# Patient Record
Sex: MALE | Race: WHITE | URBAN - NONMETROPOLITAN AREA
[De-identification: names, ages, dates, MRNs, and addresses within clinical notes are randomized per-mention and may not be internally consistent; named-entity substitution may affect disease eponyms.]

---

## 2017-02-18 DIAGNOSIS — R56.9 PARTIAL SEIZURE (HCC): ICD-10-CM

## 2017-02-20 RX ORDER — TOPIRAMATE 25 MG/1
TABLET ORAL
Qty: 540 TABLET | Refills: 0 | Status: SHIPPED | OUTPATIENT
Start: 2017-02-20 | End: 2017-06-12 | Stop reason: SDUPTHER

## 2017-02-20 RX ORDER — DIVALPROEX SODIUM 250 MG/1
TABLET, EXTENDED RELEASE ORAL
Qty: 180 TABLET | Refills: 0 | Status: SHIPPED | OUTPATIENT
Start: 2017-02-20 | End: 2017-06-12 | Stop reason: SDUPTHER

## 2017-06-12 ENCOUNTER — OFFICE VISIT (OUTPATIENT)
Dept: PHYSICAL MEDICINE AND REHAB | Age: 22
End: 2017-06-12

## 2017-06-12 VITALS
WEIGHT: 164.6 LBS | HEART RATE: 72 BPM | DIASTOLIC BLOOD PRESSURE: 79 MMHG | SYSTOLIC BLOOD PRESSURE: 121 MMHG | BODY MASS INDEX: 24.38 KG/M2 | HEIGHT: 69 IN

## 2017-06-12 DIAGNOSIS — R56.9 PARTIAL SEIZURE (HCC): Primary | ICD-10-CM

## 2017-06-12 PROCEDURE — 99213 OFFICE O/P EST LOW 20 MIN: CPT | Performed by: PSYCHIATRY & NEUROLOGY

## 2017-06-12 RX ORDER — TOPIRAMATE 25 MG/1
TABLET ORAL
Qty: 180 TABLET | Refills: 3 | Status: SHIPPED | OUTPATIENT
Start: 2017-06-12 | End: 2018-07-03 | Stop reason: SDUPTHER

## 2017-06-12 RX ORDER — DIVALPROEX SODIUM 250 MG/1
TABLET, EXTENDED RELEASE ORAL
Qty: 180 TABLET | Refills: 3 | Status: SHIPPED | OUTPATIENT
Start: 2017-06-12 | End: 2017-06-12 | Stop reason: SDUPTHER

## 2017-06-12 RX ORDER — TOPIRAMATE 25 MG/1
TABLET ORAL
Qty: 540 TABLET | Refills: 3 | Status: SHIPPED | OUTPATIENT
Start: 2017-06-12 | End: 2017-06-12 | Stop reason: SDUPTHER

## 2017-06-12 RX ORDER — DIVALPROEX SODIUM 250 MG/1
TABLET, EXTENDED RELEASE ORAL
Qty: 60 TABLET | Refills: 3 | Status: SHIPPED | OUTPATIENT
Start: 2017-06-12 | End: 2018-07-03 | Stop reason: SDUPTHER

## 2017-06-13 RX ORDER — TOPIRAMATE 50 MG/1
75 TABLET, FILM COATED ORAL 2 TIMES DAILY
Qty: 270 TABLET | Refills: 3 | Status: SHIPPED | OUTPATIENT
Start: 2017-06-13 | End: 2018-07-03 | Stop reason: SDUPTHER

## 2018-07-03 DIAGNOSIS — R56.9 PARTIAL SEIZURE (HCC): ICD-10-CM

## 2018-07-03 RX ORDER — DIVALPROEX SODIUM 250 MG/1
TABLET, EXTENDED RELEASE ORAL
Qty: 60 TABLET | Refills: 1 | Status: SHIPPED | OUTPATIENT
Start: 2018-07-03 | End: 2018-08-21 | Stop reason: SDUPTHER

## 2018-07-03 RX ORDER — TOPIRAMATE 50 MG/1
50 TABLET, FILM COATED ORAL 2 TIMES DAILY
Qty: 60 TABLET | Refills: 1 | Status: SHIPPED | OUTPATIENT
Start: 2018-07-03 | End: 2018-08-15 | Stop reason: SDUPTHER

## 2018-07-03 RX ORDER — TOPIRAMATE 25 MG/1
25 TABLET ORAL 2 TIMES DAILY
Qty: 60 TABLET | Refills: 1 | Status: SHIPPED | OUTPATIENT
Start: 2018-07-03 | End: 2018-08-15 | Stop reason: SDUPTHER

## 2018-08-15 DIAGNOSIS — R56.9 PARTIAL SEIZURE (HCC): Primary | ICD-10-CM

## 2018-08-15 RX ORDER — TOPIRAMATE 50 MG/1
TABLET, FILM COATED ORAL
Qty: 180 TABLET | Refills: 0 | Status: SHIPPED | OUTPATIENT
Start: 2018-08-15 | End: 2018-08-21 | Stop reason: SDUPTHER

## 2018-08-15 RX ORDER — TOPIRAMATE 25 MG/1
25 TABLET ORAL 2 TIMES DAILY
Qty: 60 TABLET | Refills: 2 | Status: SHIPPED | OUTPATIENT
Start: 2018-08-15 | End: 2018-08-21 | Stop reason: SDUPTHER

## 2018-08-16 LAB
ABSOLUTE BASO #: 0 /CMM (ref 0–200)
ABSOLUTE EOS #: 0 /CMM (ref 0–500)
ABSOLUTE LYMPH #: 2100 /CMM (ref 1000–4800)
ABSOLUTE MONO #: 500 /CMM (ref 0–800)
ABSOLUTE NEUT #: 4300 /CMM (ref 1800–7700)
ALBUMIN SERPL-MCNC: 5 GM/DL (ref 3.5–5)
ALP BLD-CCNC: 75 IU/L (ref 41–137)
ALT SERPL-CCNC: 17 IU/L (ref 10–40)
AST SERPL-CCNC: 20 IU/L (ref 15–41)
BASOPHILS RELATIVE PERCENT: 0.3 % (ref 0–2)
BILIRUB SERPL-MCNC: 0.7 MG/DL (ref 0.2–1)
BILIRUBIN DIRECT: 0.2 MG/DL (ref 0.1–0.2)
EOSINOPHILS RELATIVE PERCENT: 0.4 % (ref 0–6)
HCT VFR BLD CALC: 45.9 % (ref 40–49)
HEMOGLOBIN: 15.5 GM/DL (ref 13.5–16.5)
LYMPHOCYTES RELATIVE PERCENT: 29.6 % (ref 15–45)
MCH RBC QN AUTO: 30.5 PG (ref 27.5–33)
MCHC RBC AUTO-ENTMCNC: 33.8 GM/DL (ref 33–36)
MCV RBC AUTO: 90.3 CU MIC (ref 80–97)
MONOCYTES RELATIVE PERCENT: 7.3 % (ref 2–10)
NEUTROPHILS RELATIVE PERCENT: 62.4 % (ref 40–70)
NUCLEATED RBCS: 0.1 /100 WBC
PDW BLD-RTO: 12.2 % (ref 12–16)
PLATELET # BLD: 252 TH/CMM (ref 150–400)
RBC # BLD: 5.09 MIL/CMM (ref 4.5–6)
TOTAL PROTEIN: 7.6 G/DL (ref 6.2–8)
VALPROIC ACID LEVEL: 56.7 MCG/ML (ref 50–100)
WBC # BLD: 7 TH/CMM (ref 4.4–10.5)

## 2018-08-17 ENCOUNTER — TELEPHONE (OUTPATIENT)
Dept: NEUROLOGY | Age: 23
End: 2018-08-17

## 2018-08-17 NOTE — TELEPHONE ENCOUNTER
----- Message from Radha Joshi MD sent at 8/17/2018  3:57 AM EDT -----  Please let patient Know lab results were normal. Depakote level was 56.7 (normal).    Radha Joshi MD

## 2018-08-21 ENCOUNTER — OFFICE VISIT (OUTPATIENT)
Dept: NEUROLOGY | Age: 23
End: 2018-08-21
Payer: COMMERCIAL

## 2018-08-21 VITALS
HEART RATE: 60 BPM | DIASTOLIC BLOOD PRESSURE: 72 MMHG | HEIGHT: 66 IN | SYSTOLIC BLOOD PRESSURE: 118 MMHG | WEIGHT: 167 LBS | BODY MASS INDEX: 26.84 KG/M2

## 2018-08-21 DIAGNOSIS — R56.9 PARTIAL SEIZURE (HCC): ICD-10-CM

## 2018-08-21 DIAGNOSIS — G40.909 SEIZURE DISORDER (HCC): Primary | ICD-10-CM

## 2018-08-21 PROCEDURE — 99213 OFFICE O/P EST LOW 20 MIN: CPT | Performed by: PSYCHIATRY & NEUROLOGY

## 2018-08-21 PROCEDURE — 1036F TOBACCO NON-USER: CPT | Performed by: PSYCHIATRY & NEUROLOGY

## 2018-08-21 PROCEDURE — G8419 CALC BMI OUT NRM PARAM NOF/U: HCPCS | Performed by: PSYCHIATRY & NEUROLOGY

## 2018-08-21 PROCEDURE — G8427 DOCREV CUR MEDS BY ELIG CLIN: HCPCS | Performed by: PSYCHIATRY & NEUROLOGY

## 2018-08-21 RX ORDER — TOPIRAMATE 50 MG/1
TABLET, FILM COATED ORAL
Qty: 180 TABLET | Refills: 0 | Status: SHIPPED | OUTPATIENT
Start: 2018-08-21 | End: 2019-05-02 | Stop reason: SDUPTHER

## 2018-08-21 RX ORDER — DIVALPROEX SODIUM 250 MG/1
TABLET, EXTENDED RELEASE ORAL
Qty: 60 TABLET | Refills: 1 | Status: SHIPPED | OUTPATIENT
Start: 2018-08-21 | End: 2018-11-26 | Stop reason: SDUPTHER

## 2018-08-21 RX ORDER — TOPIRAMATE 25 MG/1
25 TABLET ORAL 2 TIMES DAILY
Qty: 60 TABLET | Refills: 2 | Status: SHIPPED | OUTPATIENT
Start: 2018-08-21 | End: 2019-05-02

## 2018-08-21 NOTE — PATIENT INSTRUCTIONS
1. Continue with Topamax and Depakote at current doses. Refills given. 2. CBC, hepatic panel, and Depakote level. 3. Continue to take calcium and vitamin D  4. Report any new events  5. Follow up in 1 year or sooner if needed. 6. Call if any questions or concerns.

## 2018-08-21 NOTE — PROGRESS NOTES
edema        DATA:  Results for orders placed or performed in visit on 08/15/18   CBC   Result Value Ref Range    WBC 7.0 4.4 - 10.5 th/cmm    RBC 5.09 4.50 - 6.00 mil/cmm    Hemoglobin 15.5 13.5 - 16.5 gm/dL    Hematocrit 45.9 40.0 - 49.0 %    MCV 90.3 80 - 97 CU BRIAN    MCH 30.5 27.5 - 33.0 PG    MCHC 33.8 33.0 - 36.0 gm/dL    RDW 12.2 12.0 - 16.0 %    Platelets 454 621 - 758 th/cmm    Neutrophils % 62.4 40 - 70 %    Lymphocytes % 29.6 15 - 45 %    Monocytes % 7.3 2 - 10 %    Eosinophils % 0.4 0 - 6 %    Basophils % 0.3 0 - 2 %    Nucleated RBCs 0.1 <1 /100 WBC    Absolute Neut # 4300 1800 - 7700 /cmm    Absolute Lymph # 2100 1000 - 4800 /cmm    Absolute Mono # 500 0 - 800 /cmm    Absolute Eos # 0 0 - 500 /cmm    Absolute Baso # 0 0 - 200 /cmm   Hepatic Function Panel   Result Value Ref Range    AST 20 15 - 41 IU/L    Alkaline Phosphatase 75 41 - 137 IU/L    Total Bilirubin 0.7 0.2 - 1.0 mg/dL    Bilirubin, Direct 0.2 0.1 - 0.2 mg/dL    Alb 5.0 3.5 - 5.0 gm/dL    Total Protein 7.6 6.2 - 8.0 g/dL    ALT 17 10 - 40 IU/L   Valproic Acid (Depakote)   Result Value Ref Range    Valproic Acid Lvl 56.7 50.0 - 100.0 mcg/mL           Assessment:     Diagnosis Orders   1. Seizure disorder [facility])        He is doing good. He comes in with his mother. No seizure activity reported. Last seizure was 12 years ago. He is compliant with his medications, denies any side effects to the medications. He is taking calcium and vitamin D supplements. Will give refills on medications and have patient complete blood work listed below. After detailed discussion with patient and his mother we agreed on the following plan. Plan:  1. Continue with Topamax and Depakote at current doses. Refills given. 2. CBC, hepatic panel, and Depakote level for next visit. 3. Continue to take calcium and vitamin D  4. Report any new events  5. Follow up in 1 year or sooner if needed. 6. Call if any questions or concerns.       Please call if any questions.      Timothy Hatch MD

## 2018-11-26 DIAGNOSIS — R56.9 PARTIAL SEIZURE (HCC): ICD-10-CM

## 2018-11-26 RX ORDER — DIVALPROEX SODIUM 250 MG/1
TABLET, EXTENDED RELEASE ORAL
Qty: 60 TABLET | Refills: 3 | Status: SHIPPED | OUTPATIENT
Start: 2018-11-26 | End: 2019-04-29 | Stop reason: SDUPTHER

## 2019-04-29 DIAGNOSIS — R56.9 PARTIAL SEIZURE (HCC): Primary | ICD-10-CM

## 2019-04-30 RX ORDER — DIVALPROEX SODIUM 250 MG/1
TABLET, EXTENDED RELEASE ORAL
Qty: 60 TABLET | Refills: 5 | Status: SHIPPED | OUTPATIENT
Start: 2019-04-30 | End: 2019-09-27 | Stop reason: SDUPTHER

## 2019-05-02 DIAGNOSIS — R56.9 PARTIAL SEIZURE (HCC): Primary | ICD-10-CM

## 2019-05-03 RX ORDER — TOPIRAMATE 50 MG/1
75 TABLET, FILM COATED ORAL 2 TIMES DAILY
Qty: 270 TABLET | Refills: 2 | Status: SHIPPED | OUTPATIENT
Start: 2019-05-03 | End: 2019-09-27 | Stop reason: SDUPTHER

## 2019-09-27 ENCOUNTER — OFFICE VISIT (OUTPATIENT)
Dept: NEUROLOGY | Age: 24
End: 2019-09-27
Payer: COMMERCIAL

## 2019-09-27 VITALS
DIASTOLIC BLOOD PRESSURE: 62 MMHG | BODY MASS INDEX: 24.8 KG/M2 | SYSTOLIC BLOOD PRESSURE: 106 MMHG | HEART RATE: 82 BPM | HEIGHT: 63 IN | WEIGHT: 140 LBS

## 2019-09-27 DIAGNOSIS — G40.909 SEIZURE DISORDER (HCC): Primary | ICD-10-CM

## 2019-09-27 PROCEDURE — G8420 CALC BMI NORM PARAMETERS: HCPCS | Performed by: PSYCHIATRY & NEUROLOGY

## 2019-09-27 PROCEDURE — 4004F PT TOBACCO SCREEN RCVD TLK: CPT | Performed by: PSYCHIATRY & NEUROLOGY

## 2019-09-27 PROCEDURE — 99213 OFFICE O/P EST LOW 20 MIN: CPT | Performed by: PSYCHIATRY & NEUROLOGY

## 2019-09-27 PROCEDURE — G8427 DOCREV CUR MEDS BY ELIG CLIN: HCPCS | Performed by: PSYCHIATRY & NEUROLOGY

## 2019-09-27 RX ORDER — TOPIRAMATE 50 MG/1
75 TABLET, FILM COATED ORAL 2 TIMES DAILY
Qty: 270 TABLET | Refills: 3 | Status: SHIPPED | OUTPATIENT
Start: 2019-09-27 | End: 2019-12-30

## 2019-09-27 RX ORDER — DIVALPROEX SODIUM 250 MG/1
TABLET, EXTENDED RELEASE ORAL
Qty: 60 TABLET | Refills: 11 | Status: SHIPPED | OUTPATIENT
Start: 2019-09-27 | End: 2020-09-28 | Stop reason: SDUPTHER

## 2019-10-02 LAB
ABSOLUTE BASO #: 0 X10E9/L (ref 0–0.9)
ABSOLUTE EOS #: 0 X10E9/L (ref 0–0.4)
ABSOLUTE LYMPH #: 2.2 X10E9/L (ref 1–3.5)
ABSOLUTE MONO #: 0.5 X10E9/L (ref 0–0.9)
ABSOLUTE NEUT #: 3.2 X10E9/L (ref 1.5–6.6)
ALBUMIN SERPL-MCNC: 5.1 G/DL (ref 3.2–5.3)
ALK PHOSPHATASE: 71 U/L (ref 39–130)
ALT SERPL-CCNC: 17 U/L (ref 0–40)
AST SERPL-CCNC: 16 U/L (ref 0–41)
BASOPHILS RELATIVE PERCENT: 0.3 %
BILIRUB SERPL-MCNC: 0.7 MG/DL (ref 0.3–1.2)
BILIRUBIN DIRECT: 0.2 MG/DL (ref 0–0.4)
DOSE DATE: ABNORMAL
EOSINOPHILS RELATIVE PERCENT: 0.5 %
HCT VFR BLD CALC: 43.2 % (ref 39–49)
HEMOGLOBIN: 15.2 G/DL (ref 13.1–17.3)
LYMPHOCYTE %: 37.1 %
MCH RBC QN AUTO: 31.4 PG (ref 27–34)
MCHC RBC AUTO-ENTMCNC: 35.3 G/DL (ref 32–37)
MCV RBC AUTO: 89 FL (ref 80–99)
MONOCYTES # BLD: 7.8 %
NEUTROPHILS RELATIVE PERCENT: 54.3 %
PDW BLD-RTO: 12.7 % (ref 11.4–14.3)
PLATELETS: 230 X10E9/L (ref 150–450)
PMV BLD AUTO: 9.3 FL (ref 7–12)
RBC: 4.84 X10E12/L (ref 4.25–5.65)
TOTAL PROTEIN: 7.5 G/DL (ref 6–8)
VALPROIC ACID LEVEL: 47 MCG/ML (ref 50–125)
WBC: 5.9 X10E9/L (ref 4.8–10.8)

## 2019-10-07 ENCOUNTER — TELEPHONE (OUTPATIENT)
Dept: NEUROLOGY | Age: 24
End: 2019-10-07

## 2019-10-07 DIAGNOSIS — G40.909 SEIZURE DISORDER (HCC): Primary | ICD-10-CM

## 2019-10-23 LAB
DOSE DATE: 700
VALPROIC ACID LEVEL: 50 MCG/ML (ref 50–125)

## 2019-12-30 DIAGNOSIS — G40.909 SEIZURE DISORDER (HCC): Primary | ICD-10-CM

## 2019-12-30 RX ORDER — TOPIRAMATE 50 MG/1
TABLET, FILM COATED ORAL
Qty: 270 TABLET | Refills: 3 | Status: SHIPPED | OUTPATIENT
Start: 2019-12-30 | End: 2020-09-28 | Stop reason: SDUPTHER

## 2020-09-28 ENCOUNTER — OFFICE VISIT (OUTPATIENT)
Dept: NEUROLOGY | Age: 25
End: 2020-09-28
Payer: COMMERCIAL

## 2020-09-28 VITALS
BODY MASS INDEX: 19.4 KG/M2 | DIASTOLIC BLOOD PRESSURE: 80 MMHG | HEART RATE: 76 BPM | WEIGHT: 131 LBS | HEIGHT: 69 IN | SYSTOLIC BLOOD PRESSURE: 124 MMHG

## 2020-09-28 PROCEDURE — G8420 CALC BMI NORM PARAMETERS: HCPCS | Performed by: PSYCHIATRY & NEUROLOGY

## 2020-09-28 PROCEDURE — 99213 OFFICE O/P EST LOW 20 MIN: CPT | Performed by: PSYCHIATRY & NEUROLOGY

## 2020-09-28 PROCEDURE — G8427 DOCREV CUR MEDS BY ELIG CLIN: HCPCS | Performed by: PSYCHIATRY & NEUROLOGY

## 2020-09-28 PROCEDURE — 4004F PT TOBACCO SCREEN RCVD TLK: CPT | Performed by: PSYCHIATRY & NEUROLOGY

## 2020-09-28 RX ORDER — TOPIRAMATE 50 MG/1
TABLET, FILM COATED ORAL
Qty: 270 TABLET | Refills: 3 | Status: SHIPPED | OUTPATIENT
Start: 2020-09-28 | End: 2020-09-30 | Stop reason: SDUPTHER

## 2020-09-28 RX ORDER — DIVALPROEX SODIUM 250 MG/1
TABLET, EXTENDED RELEASE ORAL
Qty: 60 TABLET | Refills: 11 | Status: SHIPPED | OUTPATIENT
Start: 2020-09-28 | End: 2020-09-30

## 2020-09-28 NOTE — PROGRESS NOTES
NEUROLOGY OUT PATIENT FOLLOW UP NOTE:  9/28/20204:07 PM    Kika Reese is here for follow up for   Patient Active Problem List   Diagnosis    Partial seizure [facility])      Patient seen as follow up for seizure. He is doing well. He comes in with his father. Reports no seizure activity. Last seizure was 13 years ago. He is going to school. He is taking Topamax and Depakote. Denies any side effects to the medications. Denies any new events. He is here with family to go over plan of care going forward. ROS:  Respiratory : no cough, no sob. Skin: no rash  No chest pain, palpitation. No Known Allergies    Current Outpatient Medications:     topiramate (TOPAMAX) 50 MG tablet, TAKE 1 AND 1/2 TABLETS BY MOUTH TWICE A DAY(DOSE INCREASED), Disp: 270 tablet, Rfl: 3    divalproex (DEPAKOTE ER) 250 MG extended release tablet, TAKE 1 TABLET BY MOUTH TWICE A DAY, Disp: 60 tablet, Rfl: 11    CALCIUM-VITAMIN D PO, Take  by mouth daily. , Disp: , Rfl:     Multiple Vitamin (MULTIVITAMINS PO), Take  by mouth daily. , Disp: , Rfl:       PE:   Vitals:    09/28/20 1549   BP: 124/80   Site: Left Upper Arm   Position: Sitting   Cuff Size: Medium Adult   Pulse: 76   Weight: 131 lb (59.4 kg)   Height: 5' 9\" (1.753 m)     General Appearance:  awake, alert, oriented, in no acute distress  Skin:  Skin color, texture, turgor normal. No rashes or lesions, warm to touch. Gen: Language is Intact. Head:  no icterus  Neck: There is no carotid bruits. The Neck is supple. Neuro: CN 2-12 grossly intact with no focal deficits. Power 5/5 on left side, right hemiparesis arm/leg. Reflexes are brisk on the right. Long tracts are intact. Cerebellar exam is Intact. Sensory exam is intact to light touch. Gait is intact.   Musculoskeletal: Has no hand arthritis, no limitation of ROM in any of the four extremities, except right upper extremity  Lower extremities no edema        DATA:  Results for orders placed or performed in visit on 09/27/19   Hepatic Function Panel   Result Value Ref Range    Total Bilirubin 0.7 0.3 - 1.2 mg/dL    Bilirubin, Direct 0.2 0.0 - 0.4 mg/dL    Alk Phosphatase 71 39 - 130 U/L    AST 16 0 - 41 U/L    ALT 17 0 - 40 U/L    Total Protein 7.5 6.0 - 8.0 g/dL    Alb 5.1 3.2 - 5.3 g/dL   CBC   Result Value Ref Range    WBC 5.9 4.8 - 10.8 X10E9/L    RBC 4.84 4.25 - 5.65 X10E12/L    Hemoglobin 15.2 13.1 - 17.3 g/dL    Hematocrit 43.2 39 - 49 %    MCV 89 80 - 99 fL    MCH 31.4 27 - 34 pg    MCHC 35.3 32 - 37 g/dL    RDW 12.7 11.4 - 14.3 %    Platelets 164 426 - 254 X10E9/L    MPV 9.3 7 - 12 fL    Neutrophils % 54.3 %    Absolute Neut # 3.2 1.5 - 6.6 X10E9/L    Lymphocyte % 37.1 %    Absolute Lymph # 2.2 1.0 - 3.5 X10E9/L    Monocytes 7.8 %    Absolute Mono # 0.5 0 - 0.9 X10E9/L    Eosinophils % 0.5 %    Absolute Eos # 0.0 0.0 - 0.4 X10E9/L    Basophils % 0.3 %    Absolute Baso # 0.0 0 - 0.9 X10E9/L   Valproic Acid Level, Free   Result Value Ref Range    Valproic Acid Lvl 47 (L) 50 - 125 mcg/ml    DOSE DATE 10/1/19 6AM 75MG    Valproic Acid Level, Free   Result Value Ref Range    Valproic Acid Lvl 50 50 - 125 mcg/ml    DOSE DATE 0700            Assessment:     Diagnosis Orders   1. Seizure disorder [facility])        He is doing good. He comes in with his mother. No seizure activity reported. Last seizure was 14 years ago. He is compliant with his medications, denies any side effects to the medications. He is taking calcium and vitamin D supplements. After detailed discussion with patient and his mother we agreed on the following plan. Plan:  1. Continue with Topamax and Depakote at current doses. Refills given. 2. CBC, hepatic panel, and Depakote level. 3. Continue to take calcium and vitamin D  4. Report any new events  5. Follow up in 1 year or sooner if needed. 6. Call if any questions or concerns. Please call if any questions.      Mindi Lefort MD

## 2020-09-30 RX ORDER — TOPIRAMATE 50 MG/1
TABLET, FILM COATED ORAL
Qty: 90 TABLET | Refills: 2 | Status: SHIPPED | OUTPATIENT
Start: 2020-09-30 | End: 2020-10-07 | Stop reason: SDUPTHER

## 2020-09-30 RX ORDER — DIVALPROEX SODIUM 250 MG/1
TABLET, EXTENDED RELEASE ORAL
Qty: 60 TABLET | Refills: 2 | Status: SHIPPED | OUTPATIENT
Start: 2020-09-30 | End: 2020-10-07 | Stop reason: SDUPTHER

## 2020-10-07 RX ORDER — DIVALPROEX SODIUM 250 MG/1
250 TABLET, EXTENDED RELEASE ORAL 2 TIMES DAILY
Qty: 180 TABLET | Refills: 1 | Status: SHIPPED | OUTPATIENT
Start: 2020-10-07 | End: 2021-03-17

## 2020-10-07 RX ORDER — TOPIRAMATE 50 MG/1
TABLET, FILM COATED ORAL
Qty: 270 TABLET | Refills: 1 | Status: SHIPPED | OUTPATIENT
Start: 2020-10-07 | End: 2021-03-17

## 2020-10-21 LAB
ABSOLUTE BASO #: 0 /CMM (ref 0–200)
ABSOLUTE EOS #: 0 /CMM (ref 0–500)
ABSOLUTE LYMPH #: 2800 /CMM (ref 1000–4800)
ABSOLUTE MONO #: 500 /CMM (ref 0–800)
ABSOLUTE NEUT #: 3000 /CMM (ref 1800–7700)
ALBUMIN SERPL-MCNC: 5 GM/DL (ref 3.5–5)
ALP BLD-CCNC: 50 IU/L (ref 41–137)
ALT SERPL-CCNC: 17 IU/L (ref 10–40)
AST SERPL-CCNC: 16 IU/L (ref 15–41)
BASOPHILS RELATIVE PERCENT: 0.5 % (ref 0–2)
BILIRUB SERPL-MCNC: 0.5 MG/DL (ref 0.2–1)
BILIRUBIN DIRECT: 0.1 MG/DL (ref 0.1–0.2)
EOSINOPHILS RELATIVE PERCENT: 0.7 % (ref 0–6)
HCT VFR BLD CALC: 47.1 % (ref 40–49)
HEMOGLOBIN: 16.2 GM/DL (ref 13.5–16.5)
LYMPHOCYTES RELATIVE PERCENT: 43.3 % (ref 15–45)
MCH RBC QN AUTO: 30.9 PG (ref 27.5–33)
MCHC RBC AUTO-ENTMCNC: 34.4 GM/DL (ref 33–36)
MCV RBC AUTO: 89.9 CU MIC (ref 80–97)
MONOCYTES RELATIVE PERCENT: 8.1 % (ref 2–10)
NEUTROPHILS RELATIVE PERCENT: 47.4 % (ref 40–70)
NUCLEATED RBCS: 0 /100 WBC
PDW BLD-RTO: 12.5 % (ref 12–16)
PLATELET # BLD: 243 TH/CMM (ref 150–400)
RBC # BLD: 5.24 MIL/CMM (ref 4.5–6)
TOTAL PROTEIN: 7.3 G/DL (ref 6.2–8)
VALPROIC ACID LEVEL: 57 MCG/ML (ref 50–100)
WBC # BLD: 6.4 TH/CMM (ref 4.4–10.5)

## 2021-03-17 DIAGNOSIS — G40.909 SEIZURE DISORDER (HCC): Primary | ICD-10-CM

## 2021-03-17 RX ORDER — DIVALPROEX SODIUM 250 MG/1
TABLET, EXTENDED RELEASE ORAL
Qty: 180 TABLET | Refills: 1 | Status: SHIPPED | OUTPATIENT
Start: 2021-03-17 | End: 2021-09-07

## 2021-03-17 RX ORDER — TOPIRAMATE 50 MG/1
TABLET, FILM COATED ORAL
Qty: 270 TABLET | Refills: 1 | Status: SHIPPED | OUTPATIENT
Start: 2021-03-17 | End: 2021-09-07

## 2021-09-06 DIAGNOSIS — G40.909 SEIZURE DISORDER (HCC): Primary | ICD-10-CM

## 2021-09-07 RX ORDER — TOPIRAMATE 50 MG/1
TABLET, FILM COATED ORAL
Qty: 270 TABLET | Refills: 1 | Status: SHIPPED | OUTPATIENT
Start: 2021-09-07 | End: 2022-02-25

## 2021-09-07 RX ORDER — DIVALPROEX SODIUM 250 MG/1
TABLET, EXTENDED RELEASE ORAL
Qty: 180 TABLET | Refills: 1 | Status: SHIPPED | OUTPATIENT
Start: 2021-09-07 | End: 2021-12-01 | Stop reason: SDUPTHER

## 2021-09-27 ENCOUNTER — OFFICE VISIT (OUTPATIENT)
Dept: NEUROLOGY | Age: 26
End: 2021-09-27
Payer: COMMERCIAL

## 2021-09-27 VITALS
HEART RATE: 85 BPM | OXYGEN SATURATION: 100 % | DIASTOLIC BLOOD PRESSURE: 88 MMHG | WEIGHT: 147 LBS | BODY MASS INDEX: 21.77 KG/M2 | HEIGHT: 69 IN | SYSTOLIC BLOOD PRESSURE: 110 MMHG

## 2021-09-27 DIAGNOSIS — G40.909 SEIZURE DISORDER (HCC): Primary | ICD-10-CM

## 2021-09-27 PROCEDURE — 4004F PT TOBACCO SCREEN RCVD TLK: CPT | Performed by: PSYCHIATRY & NEUROLOGY

## 2021-09-27 PROCEDURE — G8420 CALC BMI NORM PARAMETERS: HCPCS | Performed by: PSYCHIATRY & NEUROLOGY

## 2021-09-27 PROCEDURE — G8427 DOCREV CUR MEDS BY ELIG CLIN: HCPCS | Performed by: PSYCHIATRY & NEUROLOGY

## 2021-09-27 PROCEDURE — 99213 OFFICE O/P EST LOW 20 MIN: CPT | Performed by: PSYCHIATRY & NEUROLOGY

## 2021-09-27 NOTE — PROGRESS NOTES
NEUROLOGY OUT PATIENT FOLLOW UP NOTE:  9/27/20213:31 PM    Belle Brandon is here for follow up for   Patient Active Problem List   Diagnosis    Partial seizure [facility])      Patient seen as follow up for seizure. He is doing well. He comes in with his father. Reports no seizure activity. Last seizure was 14 years ago. He is going to school. He is taking Topamax and Depakote. Denies any side effects to the medications. Denies any new events. He is here with father to go over plan of care going forward. No Known Allergies    Current Outpatient Medications:     divalproex (DEPAKOTE ER) 250 MG extended release tablet, TAKE 1 TABLET BY MOUTH TWICE A DAY, Disp: 180 tablet, Rfl: 1    topiramate (TOPAMAX) 50 MG tablet, TAKE 1 AND 1/2 TABLETS BY MOUTH TWICE A DAY, Disp: 270 tablet, Rfl: 1    CALCIUM-VITAMIN D PO, Take  by mouth daily. , Disp: , Rfl:     Multiple Vitamin (MULTIVITAMINS PO), Take  by mouth daily. , Disp: , Rfl:       PE:   Vitals:    09/27/21 1512   BP: 110/88   Site: Left Upper Arm   Position: Sitting   Cuff Size: Medium Adult   Pulse: 85   SpO2: 100%   Weight: 147 lb (66.7 kg)   Height: 5' 9\" (1.753 m)     General Appearance:  awake, alert, oriented, in no acute distress, he is wearing a mask. Skin:  Skin color, texture, turgor normal. No rashes or lesions, warm to touch. Gen: Language is Intact. Head:  no icterus  Neck: There is no carotid bruits. The Neck is supple. Neuro: CN 2-12 grossly intact with no focal deficits. Power 5/5 on left side, there is right hemiparesis arm/leg. Reflexes are brisk on the right. Long tracts are intact. Cerebellar exam is Intact. Sensory exam is intact to light touch. Gait is intact.   Musculoskeletal: Has no hand arthritis, no limitation of ROM in any of the four extremities, except right upper extremity  Lower extremities no edema        DATA:  Results for orders placed or performed in visit on 09/28/20   CBC   Result Value Ref Range    WBC 6.4 4.4 - 10.5 th/cmm    RBC 5.24 4.50 - 6.00 mil/cmm    Hemoglobin 16.2 13.5 - 16.5 gm/dL    Hematocrit 47.1 40.0 - 49.0 %    MCV 89.9 80 - 97 CU BRIAN    MCH 30.9 27.5 - 33.0 PG    MCHC 34.4 33.0 - 36.0 gm/dL    RDW 12.5 12.0 - 16.0 %    Platelets 752 574 - 055 th/cmm    Neutrophils % 47.4 40 - 70 %    Lymphocytes % 43.3 15 - 45 %    Monocytes % 8.1 2 - 10 %    Eosinophils % 0.7 0 - 6 %    Basophils % 0.5 0 - 2 %    Nucleated RBCs 0.0 <1 /100 WBC    Absolute Neut # 3000 1800 - 7700 /cmm    Absolute Lymph # 2800 1000 - 4800 /cmm    Absolute Mono # 500 0 - 800 /cmm    Absolute Eos # 0 0 - 500 /cmm    Absolute Baso # 0 0 - 200 /cmm   Hepatic Function Panel   Result Value Ref Range    AST 16 15 - 41 IU/L    Alkaline Phosphatase 50 41 - 137 IU/L    Total Bilirubin 0.5 0.2 - 1.0 mg/dL    Bilirubin, Direct 0.1 0.1 - 0.2 mg/dL    Albumin 5.0 3.5 - 5.0 gm/dL    Total Protein 7.3 6.2 - 8.0 g/dL    ALT 17 10 - 40 IU/L   Valproic Acid (Depakote)   Result Value Ref Range    Valproic Acid Lvl 57.0 50.0 - 100.0 mcg/mL           Assessment:     Diagnosis Orders   1. Seizure disorder [facility])        He is doing good. He comes in with his mother. No seizure activity reported. Last seizure was 14 years ago. He is compliant with his medications, denies any side effects to the medications. He is taking calcium and vitamin D supplements. After detailed discussion with patient and his mother we agreed on the following plan. Plan:  1. Continue with Topamax and Depakote at current doses. Refills given. 2. CBC, hepatic panel, and Depakote level. 3. Continue to take calcium and vitamin D  4. Report any new events  5. Follow up in 1 year or sooner if needed. 6. Call if any questions or concerns. Total time 21 min.      Ramón Valverde MD

## 2021-10-05 ENCOUNTER — TELEPHONE (OUTPATIENT)
Dept: NEUROLOGY | Age: 26
End: 2021-10-05

## 2021-10-05 DIAGNOSIS — G40.909 SEIZURE DISORDER (HCC): Primary | ICD-10-CM

## 2021-10-05 LAB
ABSOLUTE BASO #: 0 X10E9/L (ref 0–0.2)
ABSOLUTE EOS #: 0 X10E9/L (ref 0–0.4)
ABSOLUTE LYMPH #: 2.7 X10E9/L (ref 1–3.5)
ABSOLUTE MONO #: 0.6 X10E9/L (ref 0–0.9)
ABSOLUTE NEUT #: 4.7 X10E9/L (ref 1.5–6.6)
ALBUMIN SERPL-MCNC: 5.5 G/DL (ref 3.2–5.3)
ALK PHOSPHATASE: 62 U/L (ref 39–130)
ALT SERPL-CCNC: 15 U/L (ref 0–40)
ANION GAP SERPL CALCULATED.3IONS-SCNC: 12 MMOL/L (ref 5–15)
AST SERPL-CCNC: 15 U/L (ref 0–41)
BASOPHILS RELATIVE PERCENT: 0.5 %
BILIRUB SERPL-MCNC: 0.9 MG/DL (ref 0.3–1.2)
BUN BLDV-MCNC: 15 MG/DL (ref 5–23)
CALCIUM SERPL-MCNC: 10.1 MG/DL (ref 8.5–10.5)
CHLORIDE BLD-SCNC: 106 MMOL/L (ref 98–109)
CO2: 24 MMOL/L (ref 22–32)
CREAT SERPL-MCNC: 0.88 MG/DL (ref 0.6–1.3)
DOSE DATE: ABNORMAL
EGFR AFRICAN AMERICAN: >60 ML/MIN/1.73SQ.M
EGFR IF NONAFRICAN AMERICAN: >60 ML/MIN/1.73SQ.M
EOSINOPHILS RELATIVE PERCENT: 0.3 %
GLUCOSE: 97 MG/DL (ref 65–99)
HCT VFR BLD CALC: 47.1 % (ref 39–49)
HEMOGLOBIN: 16 G/DL (ref 13–17)
LYMPHOCYTE %: 33.3 %
MCH RBC QN AUTO: 31.3 PG (ref 27–34)
MCHC RBC AUTO-ENTMCNC: 34.1 G/DL (ref 32–36)
MCV RBC AUTO: 92 FL (ref 80–100)
MONOCYTES # BLD: 7.3 %
NEUTROPHILS RELATIVE PERCENT: 58.6 %
PDW BLD-RTO: 12.3 % (ref 11.5–15)
PLATELETS: 274 X10E9/L (ref 150–450)
PMV BLD AUTO: 8.9 FL (ref 7–12)
POTASSIUM SERPL-SCNC: 3.7 MMOL/L (ref 3.5–5)
RBC: 5.12 X10E12/L (ref 4.1–5.7)
SODIUM BLD-SCNC: 142 MMOL/L (ref 134–146)
TOTAL PROTEIN: 7.9 G/DL (ref 6–8)
VALPROIC ACID LEVEL: 44 UG/ML (ref 50–100)
WBC: 8 X10E9/L (ref 4–11)

## 2021-10-05 NOTE — RESULT ENCOUNTER NOTE
Please ask patient if taking the *Depakote accurately, level is low =44, please verify dosage.     Hortencia Ramires MD

## 2021-10-05 NOTE — TELEPHONE ENCOUNTER
----- Message from Yasir Gama MD sent at 10/5/2021  8:19 AM EDT -----  Please ask patient if taking the #Depakote accurately, level is low =44, please verify dosage.     Yasir Gama MD

## 2021-10-06 NOTE — TELEPHONE ENCOUNTER
Change Depakote to 250 mg three times a day, repeat Depakote level in one week.    Ailyn Merchant MD

## 2021-10-16 LAB
DOSE DATE: ABNORMAL
VALPROIC ACID LEVEL: 102 UG/ML (ref 50–100)

## 2021-11-12 ENCOUNTER — TELEPHONE (OUTPATIENT)
Dept: NEUROLOGY | Age: 26
End: 2021-11-12

## 2021-11-12 NOTE — TELEPHONE ENCOUNTER
Pipe Swift (on HIPAA) called office wanting to know results of valproic acid level.  Level was 44 on 10/05/21.  Depakote was increased to 250 mg TID and labs were redrawn on 10/15/2021 with a value of 102.  Patito would like to know if there are any changes needed or if lab results were ok. Please advise.

## 2021-11-12 NOTE — TELEPHONE ENCOUNTER
Repeat Depakote level on 10/15/2021 was 102. It is satisfactory if he is having no side effects. (Fatigue sleepy off balance)  Padmini Kelley MD

## 2021-12-01 DIAGNOSIS — G40.909 SEIZURE DISORDER (HCC): Primary | ICD-10-CM

## 2021-12-01 RX ORDER — DIVALPROEX SODIUM 250 MG/1
250 TABLET, EXTENDED RELEASE ORAL 3 TIMES DAILY
Qty: 270 TABLET | Refills: 1 | Status: SHIPPED | OUTPATIENT
Start: 2021-12-01 | End: 2022-05-02

## 2022-02-25 DIAGNOSIS — G40.909 SEIZURE DISORDER (HCC): ICD-10-CM

## 2022-02-25 RX ORDER — TOPIRAMATE 50 MG/1
TABLET, FILM COATED ORAL
Qty: 270 TABLET | Refills: 1 | Status: SHIPPED | OUTPATIENT
Start: 2022-02-25 | End: 2022-08-08

## 2022-05-02 DIAGNOSIS — G40.909 SEIZURE DISORDER (HCC): Primary | ICD-10-CM

## 2022-05-02 RX ORDER — DIVALPROEX SODIUM 250 MG/1
TABLET, EXTENDED RELEASE ORAL
Qty: 270 TABLET | Refills: 1 | Status: SHIPPED | OUTPATIENT
Start: 2022-05-02

## 2022-08-08 DIAGNOSIS — G40.909 SEIZURE DISORDER (HCC): ICD-10-CM

## 2022-08-08 RX ORDER — TOPIRAMATE 50 MG/1
TABLET, FILM COATED ORAL
Qty: 270 TABLET | Refills: 1 | Status: SHIPPED | OUTPATIENT
Start: 2022-08-08

## 2022-11-20 DIAGNOSIS — G40.909 SEIZURE DISORDER (HCC): ICD-10-CM

## 2022-11-21 RX ORDER — DIVALPROEX SODIUM 250 MG/1
TABLET, EXTENDED RELEASE ORAL
Qty: 270 TABLET | Refills: 1 | Status: SHIPPED | OUTPATIENT
Start: 2022-11-21

## 2023-02-01 DIAGNOSIS — G40.909 SEIZURE DISORDER (HCC): ICD-10-CM

## 2023-02-01 RX ORDER — TOPIRAMATE 50 MG/1
TABLET, FILM COATED ORAL
Qty: 90 TABLET | Refills: 1 | Status: SHIPPED | OUTPATIENT
Start: 2023-02-01

## 2023-02-20 ENCOUNTER — OFFICE VISIT (OUTPATIENT)
Dept: NEUROLOGY | Age: 28
End: 2023-02-20
Payer: MEDICARE

## 2023-02-20 VITALS
HEART RATE: 85 BPM | WEIGHT: 160 LBS | OXYGEN SATURATION: 99 % | SYSTOLIC BLOOD PRESSURE: 110 MMHG | DIASTOLIC BLOOD PRESSURE: 70 MMHG | HEIGHT: 69 IN | BODY MASS INDEX: 23.7 KG/M2

## 2023-02-20 DIAGNOSIS — G40.909 SEIZURE DISORDER (HCC): Primary | ICD-10-CM

## 2023-02-20 PROCEDURE — 99213 OFFICE O/P EST LOW 20 MIN: CPT | Performed by: PSYCHIATRY & NEUROLOGY

## 2023-02-20 PROCEDURE — G8484 FLU IMMUNIZE NO ADMIN: HCPCS | Performed by: PSYCHIATRY & NEUROLOGY

## 2023-02-20 PROCEDURE — G8427 DOCREV CUR MEDS BY ELIG CLIN: HCPCS | Performed by: PSYCHIATRY & NEUROLOGY

## 2023-02-20 PROCEDURE — G8420 CALC BMI NORM PARAMETERS: HCPCS | Performed by: PSYCHIATRY & NEUROLOGY

## 2023-02-20 PROCEDURE — 4004F PT TOBACCO SCREEN RCVD TLK: CPT | Performed by: PSYCHIATRY & NEUROLOGY

## 2023-02-20 NOTE — PROGRESS NOTES
NEUROLOGY OUT PATIENT FOLLOW UP NOTE:  2/20/20234:01 PM    Suzanna Nunez is here for follow up for   Patient Active Problem List   Diagnosis    Partial seizure [facility])      Patient seen as follow up for seizure. He is doing well. He comes in with his mother. Reports no seizure activity. Last seizure was 16 years ago. He is taking Topamax and Depakote. He is here to go over plan. No Known Allergies    Current Outpatient Medications:     topiramate (TOPAMAX) 50 MG tablet, TAKE 1 AND 1/2 TABLETS BY MOUTH TWICE A DAY, Disp: 90 tablet, Rfl: 1    divalproex (DEPAKOTE ER) 250 MG extended release tablet, TAKE 1 TABLET BY MOUTH THREE TIMES A DAY, Disp: 270 tablet, Rfl: 1    CALCIUM-VITAMIN D PO, Take  by mouth daily. , Disp: , Rfl:     Multiple Vitamin (MULTIVITAMINS PO), Take  by mouth daily. , Disp: , Rfl:       PE:   Vitals:    02/20/23 1551   BP: 110/70   Site: Left Upper Arm   Position: Sitting   Cuff Size: Large Adult   Pulse: 85   SpO2: 99%   Weight: 160 lb (72.6 kg)   Height: 5' 9\" (1.753 m)     General Appearance:  awake, alert, oriented, in no acute distress. Skin:  Skin color, texture, turgor normal. No rashes or lesions, warm to touch. Gen: Language is Intact. Head:  no icterus  Neck: There is no carotid bruits. The Neck is supple. Neuro: CN 2-12 grossly intact with no focal deficits. Power 5/5 on left side, there is right hemiparesis arm/leg. Reflexes are brisk on the right. Long tracts are intact. Cerebellar exam is Intact. Sensory exam is intact to light touch. Gait is intact. Musculoskeletal: Has no hand arthritis, no limitation of ROM in any of the four extremities, except right upper extremity  Lower extremities no edema        DATA:  Results for orders placed or performed in visit on 09/27/21   Comprehensive Metabolic Panel   Result Value Ref Range    Glucose 97 65 - 99 mg/dL    BUN 15 5 - 23 mg/dL    Creatinine 0.88 0.60 - 1.30 mg/dL    eGFR African American >60 >59 ml/min/1.73sq. m EGFR IF NonAfrican American >60 >59 ml/min/1.73sq. m    Calcium 10.1 8.5 - 10.5 mg/dL    Sodium 142 134 - 146 mmol/L    Potassium 3.7 3.5 - 5.0 mmol/L    Chloride 106 98 - 109 mmol/L    CO2 24 22 - 32 mmol/L    Anion Gap 12 5 - 15 mmol/L    Total Bilirubin 0.9 0.3 - 1.2 mg/dL    Alk Phosphatase 62 39 - 130 U/L    AST 15 0 - 41 U/L    ALT 15 0 - 40 U/L    Total Protein 7.9 6.0 - 8.0 g/dL    Albumin 5.5 (H) 3.2 - 5.3 g/dL   CBC   Result Value Ref Range    WBC 8.0 4.0 - 11.0 X10E9/L    RBC 5.12 4.10 - 5.70 X10E12/L    Hemoglobin 16.0 13.0 - 17.0 g/dL    Hematocrit 47.1 39 - 49 %    MCV 92 80 - 100 fL    MCH 31.3 27 - 34 pg    MCHC 34.1 32 - 36 g/dL    RDW 12.3 11.5 - 15.0 %    Platelets 150 407 - 036 X10E9/L    MPV 8.9 7 - 12 fL    Neutrophils % 58.6 %    Absolute Neut # 4.7 1.5 - 6.6 X10E9/L    Lymphocyte % 33.3 %    Absolute Lymph # 2.7 1.0 - 3.5 X10E9/L    Monocytes 7.3 %    Absolute Mono # 0.6 0 - 0.9 X10E9/L    Eosinophils % 0.3 %    Absolute Eos # 0.0 0.0 - 0.4 X10E9/L    Basophils % 0.5 %    Absolute Baso # 0.0 0.0 - 0.2 X10E9/L   Valproic Acid Level, Free   Result Value Ref Range    Valproic Acid Lvl 44 (L) 50 - 100 ug/mL    DOSE DATE 630AM 10    Valproic Acid Level, Free   Result Value Ref Range    Valproic Acid Lvl 102 (H) 50 - 100 ug/mL    DOSE DATE UNKNOWN            Assessment:     Diagnosis Orders   1. Seizure disorder [facility])           He is doing good. He comes in with his mother. No seizure activity reported. Last seizure was 16 years ago. He is compliant with his medications, denies any side effects to the medications. He is taking calcium and vitamin D supplements. After detailed discussion with patient and his mother we agreed on the following plan. Plan:  Continue with Topamax and Depakote at current doses. Refills given. CBC, hepatic panel, and Depakote level. Continue to take calcium and vitamin D  Report any new events  Follow up in 1 year or sooner if needed.    Call if any questions or concerns. Total time 21 min.      Talita Barajas MD

## 2023-02-20 NOTE — PATIENT INSTRUCTIONS
Continue with Topamax and Depakote at current doses. Refills given. CBC, hepatic panel, and Depakote level. Continue to take calcium and vitamin D  Report any new events  Follow up in 1 year or sooner if needed. Call if any questions or concerns.

## 2023-02-22 LAB
ABSOLUTE BASO #: 0.02 K/UL (ref 0–0.2)
ABSOLUTE EOS #: 0.04 K/UL (ref 0–0.5)
ABSOLUTE LYMPH #: 3.08 K/UL (ref 1–4)
ABSOLUTE MONO #: 0.57 K/UL (ref 0.2–1)
ABSOLUTE NEUT #: 3.13 K/UL (ref 1.5–7.5)
BASOPHILS RELATIVE PERCENT: 0.3 %
EOSINOPHILS RELATIVE PERCENT: 0.6 %
HCT VFR BLD CALC: 47.7 % (ref 40–51)
HEMOGLOBIN: 16.4 G/DL (ref 13.5–17)
LYMPHOCYTE %: 44.8 %
MCH RBC QN AUTO: 30.7 PG (ref 25–33)
MCHC RBC AUTO-ENTMCNC: 34.4 G/DL (ref 31–36)
MCV RBC AUTO: 89.3 FL (ref 80–99)
MONOCYTES # BLD: 8.3 %
NEUTROPHILS RELATIVE PERCENT: 45.6 %
PDW BLD-RTO: 12.3 % (ref 11.5–15)
PLATELETS: 220 K/UL (ref 130–400)
PMV BLD AUTO: 9.9 FL (ref 9.3–13)
RBC: 5.34 M/UL (ref 4.5–6.1)
WBC: 6.9 K/UL (ref 3.5–11)

## 2023-02-23 LAB
ALBUMIN SERPL-MCNC: 5.1 G/DL (ref 3.5–5.2)
ALK PHOSPHATASE: 75 U/L (ref 40–115)
ALT SERPL-CCNC: 29 U/L (ref 5–50)
ANION GAP SERPL CALCULATED.3IONS-SCNC: 11 MEQ/L (ref 7–16)
AST SERPL-CCNC: 20 U/L (ref 9–50)
BILIRUB SERPL-MCNC: 0.4 MG/DL
BUN BLDV-MCNC: 12 MG/DL (ref 6–20)
CALCIUM SERPL-MCNC: 9.8 MG/DL (ref 8.5–10.5)
CHLORIDE BLD-SCNC: 103 MEQ/L (ref 95–107)
CO2: 27 MEQ/L (ref 19–31)
CREAT SERPL-MCNC: 0.8 MG/DL (ref 0.8–1.4)
EGFR IF NONAFRICAN AMERICAN: 125 ML/MIN/1.73
GLUCOSE: 82 MG/DL (ref 70–99)
POTASSIUM SERPL-SCNC: 4.1 MEQ/L (ref 3.5–5.4)
SODIUM BLD-SCNC: 141 MEQ/L (ref 133–146)
TOTAL PROTEIN: 7.8 G/DL (ref 6.1–8.3)

## 2023-02-24 LAB — VALPROIC ACID LEVEL: 68.6 UG/ML (ref 50–125)

## 2023-02-28 DIAGNOSIS — G40.909 SEIZURE DISORDER (HCC): ICD-10-CM

## 2023-02-28 RX ORDER — TOPIRAMATE 50 MG/1
TABLET, FILM COATED ORAL
Qty: 90 TABLET | Refills: 5 | Status: SHIPPED | OUTPATIENT
Start: 2023-02-28

## 2023-05-14 DIAGNOSIS — G40.909 SEIZURE DISORDER (HCC): ICD-10-CM

## 2023-05-15 RX ORDER — DIVALPROEX SODIUM 250 MG/1
TABLET, EXTENDED RELEASE ORAL
Qty: 270 TABLET | Refills: 1 | Status: SHIPPED | OUTPATIENT
Start: 2023-05-15

## 2023-08-24 DIAGNOSIS — G40.909 SEIZURE DISORDER (HCC): ICD-10-CM

## 2023-08-24 RX ORDER — TOPIRAMATE 50 MG/1
TABLET, FILM COATED ORAL
Qty: 270 TABLET | Refills: 1 | Status: SHIPPED | OUTPATIENT
Start: 2023-08-24

## 2023-08-24 NOTE — TELEPHONE ENCOUNTER
Please approve or deny     Last Visit Date:  2/20/2023 Spencerther Reap       Next Visit Date:    2/23/2024 Jose D Briseno.

## 2023-11-08 DIAGNOSIS — G40.909 SEIZURE DISORDER (HCC): ICD-10-CM

## 2023-11-08 RX ORDER — DIVALPROEX SODIUM 250 MG/1
TABLET, EXTENDED RELEASE ORAL
Qty: 270 TABLET | Refills: 1 | Status: SHIPPED | OUTPATIENT
Start: 2023-11-08

## 2024-02-23 ENCOUNTER — OFFICE VISIT (OUTPATIENT)
Dept: NEUROLOGY | Age: 29
End: 2024-02-23
Payer: MEDICARE

## 2024-02-23 VITALS
HEIGHT: 69 IN | HEART RATE: 95 BPM | OXYGEN SATURATION: 99 % | BODY MASS INDEX: 26.51 KG/M2 | WEIGHT: 179 LBS | SYSTOLIC BLOOD PRESSURE: 119 MMHG | DIASTOLIC BLOOD PRESSURE: 70 MMHG

## 2024-02-23 DIAGNOSIS — G40.909 SEIZURE DISORDER (HCC): Primary | ICD-10-CM

## 2024-02-23 PROCEDURE — G8419 CALC BMI OUT NRM PARAM NOF/U: HCPCS | Performed by: NURSE PRACTITIONER

## 2024-02-23 PROCEDURE — 99213 OFFICE O/P EST LOW 20 MIN: CPT | Performed by: NURSE PRACTITIONER

## 2024-02-23 PROCEDURE — G8427 DOCREV CUR MEDS BY ELIG CLIN: HCPCS | Performed by: NURSE PRACTITIONER

## 2024-02-23 PROCEDURE — G8484 FLU IMMUNIZE NO ADMIN: HCPCS | Performed by: NURSE PRACTITIONER

## 2024-02-23 PROCEDURE — 4004F PT TOBACCO SCREEN RCVD TLK: CPT | Performed by: NURSE PRACTITIONER

## 2024-02-23 RX ORDER — DIVALPROEX SODIUM 250 MG/1
250 TABLET, EXTENDED RELEASE ORAL 3 TIMES DAILY
Qty: 270 TABLET | Refills: 1 | Status: SHIPPED | OUTPATIENT
Start: 2024-02-23

## 2024-02-23 RX ORDER — TOPIRAMATE 50 MG/1
TABLET, FILM COATED ORAL
Qty: 270 TABLET | Refills: 1 | Status: SHIPPED | OUTPATIENT
Start: 2024-02-23

## 2024-02-23 NOTE — PROGRESS NOTES
NEUROLOGY OUT PATIENT FOLLOW UP NOTE:  2/23/20248:48 AM    Colin Chavez is here for follow up for seizure.              No Known Allergies    Current Outpatient Medications:     divalproex (DEPAKOTE ER) 250 MG extended release tablet, TAKE 1 TABLET BY MOUTH THREE TIMES A DAY, Disp: 270 tablet, Rfl: 1    topiramate (TOPAMAX) 50 MG tablet, TAKE 1 AND 1/2 TABLETS BY MOUTH TWICE A DAY, Disp: 270 tablet, Rfl: 1    CALCIUM-VITAMIN D PO, Take  by mouth daily.  , Disp: , Rfl:     Multiple Vitamin (MULTIVITAMINS PO), Take  by mouth daily.  , Disp: , Rfl:     I reviewed the past medical history, allergies, medications, social history and family history.       PE:   Vitals:    02/23/24 0844   BP: 119/70   Site: Left Upper Arm   Position: Sitting   Cuff Size: Medium Adult   Pulse: 95   SpO2: 99%   Weight: 81.2 kg (179 lb)   Height: 1.753 m (5' 9\")        General Appearance:  awake, alert, oriented   Skin:  Skin color, texture, turgor normal. No rashes or lesions, warm to touch.  Gen: Language is Intact.  Head:  no icterus  Neck: There is no carotid bruits. The Neck is supple.  Neuro: CN 2-12 grossly intact with no focal deficits. Power 5/5 on left side, there is right hemiparesis arm/leg. Reflexes are brisk on the right. Long tracts are intact. Cerebellar exam is Intact. Sensory exam is intact to light touch. Gait is intact.  Musculoskeletal: Has no hand arthritis, no limitation of ROM in any of the four extremities, except right upper extremity  Lower extremities no edema        DATA:         Results for orders placed or performed in visit on 02/20/23   Valproic Acid Level, Free   Result Value Ref Range    Valproic Acid Lvl 68.6 50.0 - 125.0 UG/ML   Comprehensive Metabolic Panel   Result Value Ref Range    Glucose 82 70 - 99 mg/dL    BUN 12 6 - 20 mg/dL    Creatinine 0.80 0.80 - 1.40 mg/dL    EGFR IF NonAfrican American 125 >60 mL/min/1.73    Calcium 9.8 8.5 - 10.5 mg/dL    Sodium 141 133 - 146 meq/L    Potassium 4.1 3.5 -

## 2024-02-27 LAB
ABSOLUTE BASO #: 0.04 K/UL (ref 0–0.2)
ABSOLUTE EOS #: 0.07 K/UL (ref 0–0.5)
ABSOLUTE LYMPH #: 2.78 K/UL (ref 1–4)
ABSOLUTE MONO #: 0.64 K/UL (ref 0.2–1)
ABSOLUTE NEUT #: 3.57 K/UL (ref 1.5–7.5)
BASOPHILS RELATIVE PERCENT: 0.6 %
EOSINOPHILS RELATIVE PERCENT: 1 %
HCT VFR BLD CALC: 48.8 % (ref 40–51)
HEMOGLOBIN: 16.9 G/DL (ref 13.5–17)
LYMPHOCYTE %: 39 %
MCH RBC QN AUTO: 30.7 PG (ref 25–33)
MCHC RBC AUTO-ENTMCNC: 34.6 G/DL (ref 31–36)
MCV RBC AUTO: 88.7 FL (ref 80–99)
MONOCYTES # BLD: 9 %
NEUTROPHILS RELATIVE PERCENT: 50.1 %
PDW BLD-RTO: 12.3 % (ref 11.5–15)
PLATELETS: 270 K/UL (ref 130–400)
PMV BLD AUTO: 10.2 FL (ref 9.3–13)
RBC: 5.5 M/UL (ref 4.5–6.1)
WBC: 7.1 K/UL (ref 3.5–11)

## 2024-02-28 LAB
ALBUMIN SERPL-MCNC: 5.6 G/DL (ref 3.5–5.2)
ALK PHOSPHATASE: 81 U/L (ref 40–115)
ALT SERPL-CCNC: 34 U/L (ref 5–50)
ANION GAP SERPL CALCULATED.3IONS-SCNC: 16 MEQ/L (ref 7–16)
AST SERPL-CCNC: 25 U/L (ref 9–50)
BILIRUB SERPL-MCNC: 0.3 MG/DL
BUN BLDV-MCNC: 10 MG/DL (ref 6–20)
CALCIUM SERPL-MCNC: 9.9 MG/DL (ref 8.5–10.5)
CHLORIDE BLD-SCNC: 106 MEQ/L (ref 95–107)
CO2: 23 MEQ/L (ref 19–31)
CREAT SERPL-MCNC: 0.71 MG/DL (ref 0.8–1.4)
EGFR IF NONAFRICAN AMERICAN: 129 ML/MIN/1.73
GLUCOSE: 87 MG/DL (ref 70–99)
POTASSIUM SERPL-SCNC: 4 MEQ/L (ref 3.5–5.4)
SODIUM BLD-SCNC: 145 MEQ/L (ref 133–146)
TOTAL PROTEIN: 8.1 G/DL (ref 6.1–8.3)

## 2024-02-29 LAB — VALPROIC ACID LEVEL: 67 UG/ML (ref 50–125)

## 2024-03-01 DIAGNOSIS — G40.909 SEIZURE DISORDER (HCC): ICD-10-CM

## 2024-03-01 RX ORDER — DIVALPROEX SODIUM 250 MG/1
250 TABLET, EXTENDED RELEASE ORAL 3 TIMES DAILY
Qty: 270 TABLET | Refills: 1 | Status: SHIPPED | OUTPATIENT
Start: 2024-03-01

## 2024-03-01 RX ORDER — TOPIRAMATE 50 MG/1
TABLET, FILM COATED ORAL
Qty: 270 TABLET | Refills: 1 | Status: SHIPPED | OUTPATIENT
Start: 2024-03-01

## 2024-03-01 RX ORDER — DIVALPROEX SODIUM 250 MG/1
250 TABLET, EXTENDED RELEASE ORAL 3 TIMES DAILY
Qty: 270 TABLET | Refills: 1 | OUTPATIENT
Start: 2024-03-01

## 2024-03-01 NOTE — TELEPHONE ENCOUNTER
Colin Chavez called requesting a refill on the following medications:  Requested Prescriptions     Pending Prescriptions Disp Refills    divalproex (DEPAKOTE ER) 250 MG extended release tablet 270 tablet 1     Sig: Take 1 tablet by mouth 3 times daily    topiramate (TOPAMAX) 50 MG tablet 270 tablet 1     Sig: TAKE 1 AND 1/2 TABLETS BY MOUTH TWICE A DAY       Date of last visit: 2/23/2024- Paige Leopold, CNP  Date of next visit (if applicable):2/21/25- Dr. Lugo  Date of last refill: 2/23/24  Pharmacy Name: Patient requesting change of Pharmacy to [facility]      Thanks,  Hollie Cespedes LPN

## 2024-05-10 ENCOUNTER — TELEPHONE (OUTPATIENT)
Dept: NEUROLOGY | Age: 29
End: 2024-05-10

## 2024-05-10 NOTE — TELEPHONE ENCOUNTER
LOV: 2/23/24- Paige Leopold, CNP  F/U 2/21/25- Dr. Lugo    Reagan/patient called to report that the pharmacy can not refill his medication as it is too early.  Reagan reports patient has a 7 day supply left. Questioning if provider would call a short term supply into [facility]. Reagan reports patient will need a 10 day supply and will pay out of pocket if needed to get patient through till refill can be dispensed.   Patient currently takes topamax 50 mg 1.5 tablets PO BID. Patient and father unsure of what happened to the missing medication.

## 2024-08-08 DIAGNOSIS — G40.909 SEIZURE DISORDER (HCC): ICD-10-CM

## 2024-08-08 RX ORDER — TOPIRAMATE 50 MG/1
TABLET, FILM COATED ORAL
Qty: 270 TABLET | Refills: 1 | Status: SHIPPED | OUTPATIENT
Start: 2024-08-08

## 2024-08-08 NOTE — TELEPHONE ENCOUNTER
Colin Chavez called requesting a refill on the following medications:  Requested Prescriptions     Pending Prescriptions Disp Refills    topiramate (TOPAMAX) 50 MG tablet [Pharmacy Med Name: TOPIRAMATE 50 MG TABLET] 270 tablet 1     Sig: TAKE 1 AND 1/2 TABLETS BY MOUTH TWICE A DAY       Date of last visit: 2/23/2024- Paige Leopold, CNP  Date of next visit (if applicable):2/21/2025- Dr. Lugo  Date of last refill: 3/1/24  Pharmacy Name: Hollie Chávez LPN

## 2024-10-23 DIAGNOSIS — G40.909 SEIZURE DISORDER (HCC): ICD-10-CM

## 2024-10-23 RX ORDER — DIVALPROEX SODIUM 250 MG/1
250 TABLET, FILM COATED, EXTENDED RELEASE ORAL 3 TIMES DAILY
Qty: 270 TABLET | Refills: 1 | Status: SHIPPED | OUTPATIENT
Start: 2024-10-23

## 2024-10-23 NOTE — TELEPHONE ENCOUNTER
Please approve or deny     Last Visit Date:  2/23/2024   Paige Leopold     Next Visit Date:    2/21/2025

## 2025-02-11 ENCOUNTER — OFFICE VISIT (OUTPATIENT)
Dept: NEUROLOGY | Age: 30
End: 2025-02-11
Payer: MEDICARE

## 2025-02-11 VITALS
BODY MASS INDEX: 27.28 KG/M2 | OXYGEN SATURATION: 98 % | HEART RATE: 81 BPM | HEIGHT: 69 IN | SYSTOLIC BLOOD PRESSURE: 118 MMHG | DIASTOLIC BLOOD PRESSURE: 64 MMHG | WEIGHT: 184.2 LBS

## 2025-02-11 DIAGNOSIS — G40.909 SEIZURE DISORDER (HCC): Primary | ICD-10-CM

## 2025-02-11 PROCEDURE — 99213 OFFICE O/P EST LOW 20 MIN: CPT | Performed by: NURSE PRACTITIONER

## 2025-02-11 PROCEDURE — G8419 CALC BMI OUT NRM PARAM NOF/U: HCPCS | Performed by: NURSE PRACTITIONER

## 2025-02-11 PROCEDURE — 4004F PT TOBACCO SCREEN RCVD TLK: CPT | Performed by: NURSE PRACTITIONER

## 2025-02-11 PROCEDURE — G8427 DOCREV CUR MEDS BY ELIG CLIN: HCPCS | Performed by: NURSE PRACTITIONER

## 2025-02-11 RX ORDER — DIVALPROEX SODIUM 250 MG/1
250 TABLET, FILM COATED, EXTENDED RELEASE ORAL 3 TIMES DAILY
Qty: 270 TABLET | Refills: 3 | Status: SHIPPED | OUTPATIENT
Start: 2025-02-11

## 2025-02-11 RX ORDER — TOPIRAMATE 50 MG/1
TABLET, FILM COATED ORAL
Qty: 270 TABLET | Refills: 3 | Status: SHIPPED | OUTPATIENT
Start: 2025-02-11

## 2025-02-11 NOTE — PROGRESS NOTES
NEUROLOGY OUT PATIENT FOLLOW UP NOTE:  2/11/202510:24 AM    Colin Chavez is here for follow up for seizure.         No Known Allergies    Current Outpatient Medications:     divalproex (DEPAKOTE ER) 250 MG extended release tablet, TAKE 1 TABLET BY MOUTH THREE TIMES A DAY, Disp: 270 tablet, Rfl: 1    topiramate (TOPAMAX) 50 MG tablet, TAKE 1 AND 1/2 TABLETS BY MOUTH TWICE A DAY, Disp: 270 tablet, Rfl: 1    CALCIUM-VITAMIN D PO, Take  by mouth daily.  , Disp: , Rfl:     Multiple Vitamin (MULTIVITAMINS PO), Take  by mouth daily.  , Disp: , Rfl:     I reviewed the past medical history, allergies, medications, social history and family history.       PE:   Vitals:    02/11/25 1019   BP: 118/64   Site: Left Upper Arm   Position: Sitting   Pulse: 81   SpO2: 98%   Weight: 83.6 kg (184 lb 3.2 oz)   Height: 1.753 m (5' 9\")        General Appearance:  awake, alert, oriented   Skin:  Skin color, texture, turgor normal. No rashes or lesions, warm to touch.  Gen: Language is Intact.  Head:  no icterus  Neck: There is no carotid bruits. The Neck is supple.  Neuro: CN 2-12 grossly intact with no focal deficits. Power 5/5 on left side, there is right hemiparesis arm/leg. Reflexes are brisk on the right. Long tracts are intact. Cerebellar exam is Intact. Sensory exam is intact to light touch. Gait is intact.  Musculoskeletal: Has no hand arthritis, no limitation of ROM in any of the four extremities, except right upper extremity  Lower extremities no edema        DATA:       Results for orders placed or performed in visit on 02/23/24   CBC   Result Value Ref Range    WBC 7.1 3.5 - 11.0 K/uL    RBC 5.50 4.50 - 6.10 M/uL    Hemoglobin 16.9 13.5 - 17.0 g/dL    Hematocrit 48.8 40.0 - 51.0 %    MCV 88.7 80.0 - 99.0 fL    MCH 30.7 25.0 - 33.0 pg    MCHC 34.6 31.0 - 36.0 g/dL    RDW 12.3 11.5 - 15.0 %    Neutrophils % 50.1 %    Lymphocyte % 39.0 %    Monocytes % 9.0 %    Eosinophils % 1.0 %    Basophils % 0.6 %    Platelets 270 130 -

## 2025-02-12 ENCOUNTER — TELEPHONE (OUTPATIENT)
Dept: NEUROLOGY | Age: 30
End: 2025-02-12

## 2025-02-12 LAB
ALBUMIN: 5 G/DL (ref 3.5–5.2)
ALK PHOSPHATASE: 72 U/L (ref 39–118)
ALT SERPL-CCNC: 38 U/L (ref 5–41)
AST SERPL-CCNC: 24 U/L (ref 9–50)
BASOPHILS ABSOLUTE: 0.03 K/UL (ref 0–0.2)
BASOPHILS RELATIVE PERCENT: 0.4 % (ref 0–2)
BILIRUB SERPL-MCNC: 0.7 MG/DL
BILIRUBIN DIRECT: 0.2 MG/DL
EOSINOPHILS ABSOLUTE: 0.12 K/UL (ref 0–0.8)
EOSINOPHILS RELATIVE PERCENT: 1.5 % (ref 0–5)
HCT VFR BLD CALC: 47.6 % (ref 39–52)
HEMOGLOBIN: 16.5 G/DL (ref 13–18)
IMMATURE GRANS (ABS): 0.03 K/UL (ref 0–0.06)
IMMATURE GRANULOCYTES %: 0.4 % (ref 0–2)
LYMPHOCYTES ABSOLUTE: 3.69 K/UL (ref 0.9–5.2)
LYMPHOCYTES RELATIVE PERCENT: 47.1 % (ref 20–45)
MCH RBC QN AUTO: 31.2 PG (ref 26–32)
MCHC RBC AUTO-ENTMCNC: 34.7 G/DL (ref 32–35)
MCV RBC AUTO: 90 FL (ref 75–100)
MONOCYTES ABSOLUTE: 0.68 K/UL (ref 0.1–1)
MONOCYTES RELATIVE PERCENT: 8.7 % (ref 0–13)
NEUTROPHILS ABSOLUTE: 3.29 K/UL (ref 1.9–8)
NEUTROPHILS RELATIVE PERCENT: 41.9 % (ref 45–75)
PDW BLD-RTO: 12.2 % (ref 11.2–14.8)
PLATELET # BLD: 263 THOUS/CMM (ref 140–440)
RBC # BLD: 5.29 MILL/CMM (ref 4.4–6.1)
TOTAL PROTEIN: 8 G/DL (ref 6–8.3)
VALPROIC ACID LEVEL: 49 UG/ML (ref 50–100)
WBC # BLD: 7.8 THDS/CMM (ref 3.6–11)

## 2025-02-12 NOTE — TELEPHONE ENCOUNTER
----- Message from Paige Leopold, APRN - CNP sent at 2/12/2025  9:39 AM EST -----  Please let patent know his Depakote level is on low end of normal=49.0. His previous level was therapeutic =67.0 done 2/27/24.  Has he missed any doses? Is he taking his medication consistently?  Please ask him to repeat Depakote level in 1 week.  Paige Leopold, CNP

## 2025-02-12 NOTE — TELEPHONE ENCOUNTER
Mother Rajwinder informed and reports patient takes his medication consistently. Lab order faxed to [facility] in [place] per request.

## 2025-02-19 ENCOUNTER — TELEPHONE (OUTPATIENT)
Dept: NEUROLOGY | Age: 30
End: 2025-02-19

## 2025-02-19 LAB — VALPROIC ACID LEVEL: 65 UG/ML (ref 50–100)

## 2025-02-19 NOTE — TELEPHONE ENCOUNTER
----- Message from Paige Leopold, APRN - CNP sent at 2/19/2025  7:56 AM EST -----  Please let patient know his Depakote level is in therapeutic range=65. Continue with current dose  Paige Leopold, CNP